# Patient Record
Sex: FEMALE | URBAN - METROPOLITAN AREA
[De-identification: names, ages, dates, MRNs, and addresses within clinical notes are randomized per-mention and may not be internally consistent; named-entity substitution may affect disease eponyms.]

---

## 2023-10-04 ENCOUNTER — APPOINTMENT (RX ONLY)
Dept: URBAN - METROPOLITAN AREA CLINIC 89 | Facility: CLINIC | Age: 33
Setting detail: DERMATOLOGY
End: 2023-10-04

## 2023-10-04 DIAGNOSIS — D22 MELANOCYTIC NEVI: ICD-10-CM

## 2023-10-04 PROBLEM — D22.5 MELANOCYTIC NEVI OF TRUNK: Status: ACTIVE | Noted: 2023-10-04

## 2023-10-04 PROBLEM — D23.71 OTHER BENIGN NEOPLASM OF SKIN OF RIGHT LOWER LIMB, INCLUDING HIP: Status: ACTIVE | Noted: 2023-10-04

## 2023-10-04 PROBLEM — D23.5 OTHER BENIGN NEOPLASM OF SKIN OF TRUNK: Status: ACTIVE | Noted: 2023-10-04

## 2023-10-04 PROCEDURE — 99202 OFFICE O/P NEW SF 15 MIN: CPT

## 2023-10-04 PROCEDURE — ? COUNSELING

## 2023-10-04 ASSESSMENT — LOCATION DETAILED DESCRIPTION DERM: LOCATION DETAILED: INFERIOR THORACIC SPINE

## 2023-10-04 ASSESSMENT — LOCATION SIMPLE DESCRIPTION DERM: LOCATION SIMPLE: UPPER BACK

## 2023-10-04 ASSESSMENT — LOCATION ZONE DERM: LOCATION ZONE: TRUNK

## 2023-10-04 NOTE — PROCEDURE: MIPS QUALITY
Detail Level: Generalized
Quality 130: Documentation Of Current Medications In The Medical Record: Current Medications Documented
Quality 110: Preventive Care And Screening: Influenza Immunization: Influenza immunization was not ordered or administered, reason not given
Quality 431: Preventive Care And Screening: Unhealthy Alcohol Use - Screening: Patient not identified as an unhealthy alcohol user when screened for unhealthy alcohol use using a systematic screening method
Quality 226: Preventive Care And Screening: Tobacco Use: Screening And Cessation Intervention: Patient screened for tobacco use and is an ex/non-smoker

## 2023-10-04 NOTE — HPI: SKIN LESION (DERMATOFIBROMA)
Fax from [facility] AV, pulmicort NOT COVERED, alternates are:    ~FLOVENT DISKUS  ~FLOVENT HFA  ~QVAR    ROUTED TO MINOR RICHARDS MD, PLEASE SEND ALTERNATE    Jagruti Franks RN, BSN  Message handled by Nurse Triage.    
How Severe Is Your Dermatofibroma?: mild
See flovent dpi    It's not clear what she's tried before so we'll start there.   
Is This A New Presentation, Or A Follow-Up?: Skin Lesions
Additional History: Pt stated she has a history of melanoma with her father. Pt though this lesion was a bug bite but it has not healing in years.